# Patient Record
Sex: FEMALE | Race: WHITE | NOT HISPANIC OR LATINO | Employment: FULL TIME | URBAN - METROPOLITAN AREA
[De-identification: names, ages, dates, MRNs, and addresses within clinical notes are randomized per-mention and may not be internally consistent; named-entity substitution may affect disease eponyms.]

---

## 2017-01-26 ENCOUNTER — OFFICE VISIT (OUTPATIENT)
Dept: URGENT CARE | Facility: URGENT CARE | Age: 42
End: 2017-01-26
Payer: COMMERCIAL

## 2017-01-26 VITALS
OXYGEN SATURATION: 99 % | TEMPERATURE: 97 F | SYSTOLIC BLOOD PRESSURE: 106 MMHG | BODY MASS INDEX: 20.57 KG/M2 | HEIGHT: 66 IN | WEIGHT: 128 LBS | DIASTOLIC BLOOD PRESSURE: 66 MMHG | HEART RATE: 68 BPM

## 2017-01-26 DIAGNOSIS — R10.9 FLANK PAIN: ICD-10-CM

## 2017-01-26 DIAGNOSIS — N10 PYELONEPHRITIS, ACUTE: Primary | ICD-10-CM

## 2017-01-26 DIAGNOSIS — R82.90 NONSPECIFIC FINDING ON EXAMINATION OF URINE: ICD-10-CM

## 2017-01-26 LAB
ALBUMIN UR-MCNC: 100 MG/DL
APPEARANCE UR: ABNORMAL
BACTERIA #/AREA URNS HPF: ABNORMAL /HPF
BILIRUB UR QL STRIP: NEGATIVE
COLOR UR AUTO: YELLOW
GLUCOSE UR STRIP-MCNC: NEGATIVE MG/DL
HGB UR QL STRIP: ABNORMAL
KETONES UR STRIP-MCNC: NEGATIVE MG/DL
LEUKOCYTE ESTERASE UR QL STRIP: ABNORMAL
NITRATE UR QL: POSITIVE
PH UR STRIP: 6 PH (ref 5–7)
RBC #/AREA URNS AUTO: ABNORMAL /HPF (ref 0–2)
SP GR UR STRIP: 1.02 (ref 1–1.03)
URN SPEC COLLECT METH UR: ABNORMAL
UROBILINOGEN UR STRIP-ACNC: 0.2 EU/DL (ref 0.2–1)
WBC #/AREA URNS AUTO: ABNORMAL /HPF (ref 0–2)

## 2017-01-26 PROCEDURE — 87186 SC STD MICRODIL/AGAR DIL: CPT | Performed by: FAMILY MEDICINE

## 2017-01-26 PROCEDURE — 81001 URINALYSIS AUTO W/SCOPE: CPT | Performed by: FAMILY MEDICINE

## 2017-01-26 PROCEDURE — 87088 URINE BACTERIA CULTURE: CPT | Performed by: FAMILY MEDICINE

## 2017-01-26 PROCEDURE — 87086 URINE CULTURE/COLONY COUNT: CPT | Performed by: FAMILY MEDICINE

## 2017-01-26 PROCEDURE — 99213 OFFICE O/P EST LOW 20 MIN: CPT | Performed by: FAMILY MEDICINE

## 2017-01-26 RX ORDER — CIPROFLOXACIN 500 MG/1
500 TABLET, FILM COATED ORAL 2 TIMES DAILY
Qty: 14 TABLET | Refills: 0 | Status: SHIPPED | OUTPATIENT
Start: 2017-01-26 | End: 2017-02-02

## 2017-01-26 NOTE — MR AVS SNAPSHOT
"              After Visit Summary   1/26/2017    Nupur De La Rosa    MRN: [id number]           Patient Information     Date Of Birth          1975        Visit Information        Provider Department      1/26/2017 6:15 PM Melita Barreto MD [facility]        Today's Diagnoses     Pyelonephritis, acute    -  1     Flank pain         Nonspecific finding on examination of urine           Care Instructions      Kidney Infection (Adult, Female)    An infection of the kidney is also called \"pyelonephritis.\" It usually starts as a bladder infection (\"cystitis\") that spreads to the kidneys. Pyelonephritis is more serious than a bladder infection. It can cause severe illness if not treated properly.  The usual symptoms include an aching pain in the back, side, or lower abdomen. Other symptoms may include fever, chills, nausea, vomiting, blood in the urine, an urge to urinate, and a burning sensation when urinating. Treatment is usually oral antibiotics, or in more severe cases, intramuscular or IV antibiotics. These are started right away and may be changed once urine culture results determine the infecting organisms.  Home care  The following are general care guidelines:  1. Stay home from work or school. Rest in bed until your fever breaks and you are feeling better.  2. Drink lots of fluid (at least 6 to 8 glasses a day, unless you must restrict fluids for other medical reasons). This will force the medicine into your urinary system and flush the bacteria out of your body.  3. Avoid sex until you have finished all of your medicine and your symptoms are gone.  4. Avoid caffeine, alcohol, and spicy foods. These foods may irritate the kidney and bladder.  5. You may use acetaminophen or ibuprofen to control pain, unless another pain medicine was prescribed. [NOTE: If you have chronic liver or kidney disease or ever had a stomach ulcer or GI bleeding, talk with your doctor before " using these medicines.]  Follow-up care  Follow up with your doctor or as advised by our staff for a repeat urine test in 10 days. This will ensure that your infection is fully cleared.  [NOTE: If you had an X-ray, CT scan, or other diagnostic test, it will be reviewed by a specialist. You will be notified of any new findings that may affect your care.]  When to seek medical care  Get prompt medical attention if any of the following occur:    Fever over 100.4 F (38.0 C) after 48 hours of treatment    No improvement by the third day of treatment    Increasing back or abdominal pain    Repeated vomiting or inability to take oral medicine    Weakness, dizziness, or fainting    6865-5255 [employer]. [address] [place] [address], [place] [address]. All rights reserved. This information is not intended as a substitute for professional medical care. Always follow your healthcare professional's instructions.              Follow-ups after your visit        Who to contact     If you have questions or need follow up information about today's clinic visit or your schedule please contact [facility] directly at [phone].  Normal or non-critical lab and imaging results will be communicated to you by Zalandohart, letter or phone within 4 business days after the clinic has received the results. If you do not hear from us within 7 days, please contact the clinic through Nymirumt or phone. If you have a critical or abnormal lab result, we will notify you by phone as soon as possible.  Submit refill requests through [employer] or call your pharmacy and they will forward the refill request to us. Please allow 3 business days for your refill to be completed.          Additional Information About Your Visit        [employer] Information     [employer] gives you secure access to your electronic health record. If you see a primary care provider, you can also send messages to your care team and make appointments. If  "you have questions, please call your primary care clinic.  If you do not have a primary care provider, please call [phone] and they will assist you.        Care EveryWhere ID     This is your Care EveryWhere ID. This could be used by other organizations to access your [place] medical records  [id number]        Your Vitals Were     Pulse Temperature Height BMI (Body Mass Index) Pulse Oximetry Breastfeeding?    68 97  F (36.1  C) (Tympanic) 5' 6\" (1.676 m) 20.67 kg/m2 99% No       Blood Pressure from Last 3 Encounters:   01/26/17 106/66   12/12/16 115/65   10/08/16 104/60    Weight from Last 3 Encounters:   01/26/17 128 lb (58.06 kg)   12/12/16 126 lb (57.153 kg)   10/08/16 129 lb (58.514 kg)              We Performed the Following     *UA reflex to Microscopic and Culture ([facility] (except [place] and [place])     Urine Culture Aerobic Bacterial     Urine Microscopic          Today's Medication Changes          These changes are accurate as of: 1/26/17  7:45 PM.  If you have any questions, ask your nurse or doctor.               Start taking these medicines.        Dose/Directions    ciprofloxacin 500 MG tablet   Commonly known as:  CIPRO   Used for:  Pyelonephritis, acute   Started by:  Melita Barreto MD        Dose:  500 mg   Take 1 tablet (500 mg) by mouth 2 times daily for 7 days   Quantity:  14 tablet   Refills:  0            Where to get your medicines      These medications were sent to [id number] - [place], [place] - [address] AT [address]  [address], [place] [address]     Phone:  [phone]    - ciprofloxacin 500 MG tablet             Primary Care Provider Office Phone # Fax #    [facility] [phone]       [facility] [address] [place] [address]        Thank you!     Thank you for choosing [facility]  for " your care. Our goal is always to provide you with excellent care. Hearing back from our patients is one way we can continue to improve our services. Please take a few minutes to complete the written survey that you may receive in the mail after your visit with us. Thank you!             Your Updated Medication List - Protect others around you: Learn how to safely use, store and throw away your medicines at www.disposemymeds.org.          This list is accurate as of: 1/26/17  7:45 PM.  Always use your most recent med list.                   Brand Name Dispense Instructions for use    ciprofloxacin 500 MG tablet    CIPRO    14 tablet    Take 1 tablet (500 mg) by mouth 2 times daily for 7 days       estrogens (conjugated) 1.25 MG tablet    PREMARIN    30 tablet    Take 1 tablet (1.25 mg) by mouth daily

## 2017-01-27 NOTE — PATIENT INSTRUCTIONS
"  Kidney Infection (Adult, Female)    An infection of the kidney is also called \"pyelonephritis.\" It usually starts as a bladder infection (\"cystitis\") that spreads to the kidneys. Pyelonephritis is more serious than a bladder infection. It can cause severe illness if not treated properly.  The usual symptoms include an aching pain in the back, side, or lower abdomen. Other symptoms may include fever, chills, nausea, vomiting, blood in the urine, an urge to urinate, and a burning sensation when urinating. Treatment is usually oral antibiotics, or in more severe cases, intramuscular or IV antibiotics. These are started right away and may be changed once urine culture results determine the infecting organisms.  Home care  The following are general care guidelines:  1. Stay home from work or school. Rest in bed until your fever breaks and you are feeling better.  2. Drink lots of fluid (at least 6 to 8 glasses a day, unless you must restrict fluids for other medical reasons). This will force the medicine into your urinary system and flush the bacteria out of your body.  3. Avoid sex until you have finished all of your medicine and your symptoms are gone.  4. Avoid caffeine, alcohol, and spicy foods. These foods may irritate the kidney and bladder.  5. You may use acetaminophen or ibuprofen to control pain, unless another pain medicine was prescribed. [NOTE: If you have chronic liver or kidney disease or ever had a stomach ulcer or GI bleeding, talk with your doctor before using these medicines.]  Follow-up care  Follow up with your doctor or as advised by our staff for a repeat urine test in 10 days. This will ensure that your infection is fully cleared.  [NOTE: If you had an X-ray, CT scan, or other diagnostic test, it will be reviewed by a specialist. You will be notified of any new findings that may affect your care.]  When to seek medical care  Get prompt medical attention if any of the following occur:    Fever " over 100.4 F (38.0 C) after 48 hours of treatment    No improvement by the third day of treatment    Increasing back or abdominal pain    Repeated vomiting or inability to take oral medicine    Weakness, dizziness, or fainting    0233-9504 [employer]. [address] [place] [address], [place] [address]. All rights reserved. This information is not intended as a substitute for professional medical care. Always follow your healthcare professional's instructions.

## 2017-01-27 NOTE — PROGRESS NOTES
SUBJECTIVE: Nupur De La Rosa is a 41 year old female who complains of urinary frequency and bladder pressure x 1 day, with left flank pain, feverish feeling and chills but no abnormal vaginal discharge or bleeding.     OBJECTIVE: Appears well, in no apparent distress.  Vital signs are normal. The abdomen is soft with mild suprapubic tenderness but no guarding, mass, rebound or organomegaly.  Mild left CVA tenderness.  Labs:   Results for orders placed or performed in visit on 01/26/17   *UA reflex to Microscopic and Culture ([facility] (except [place] and [place])   Result Value Ref Range    Color Urine Yellow     Appearance Urine Slightly Cloudy     Glucose Urine Negative NEG mg/dL    Bilirubin Urine Negative NEG    Ketones Urine Negative NEG mg/dL    Specific Gravity Urine 1.020 1.003 - 1.035    Blood Urine Moderate (A) NEG    pH Urine 6.0 5.0 - 7.0 pH    Protein Albumin Urine 100 (A) NEG mg/dL    Urobilinogen Urine 0.2 0.2 - 1.0 EU/dL    Nitrite Urine Positive (A) NEG    Leukocyte Esterase Urine Moderate (A) NEG    Source Midstream Urine    Urine Microscopic   Result Value Ref Range    WBC Urine  (A) 0 - 2 /HPF    RBC Urine 2-5 (A) 0 - 2 /HPF    Bacteria Urine Few (A) NEG /HPF      ASSESSMENT: Acute pyelonephritis    PLAN: Treatment per orders - also push fluids, may use Pyridium OTC prn. Call or return to clinic prn if these symptoms worsen or fail to improve as anticipated.  Melita Benton MD

## 2017-01-28 LAB
BACTERIA SPEC CULT: ABNORMAL
MICRO REPORT STATUS: ABNORMAL
MICROORGANISM SPEC CULT: ABNORMAL
SPECIMEN SOURCE: ABNORMAL

## 2017-01-31 DIAGNOSIS — B37.9 YEAST INFECTION: Primary | ICD-10-CM

## 2017-01-31 RX ORDER — FLUCONAZOLE 150 MG/1
150 TABLET ORAL ONCE
Qty: 1 TABLET | Refills: 0 | Status: SHIPPED | OUTPATIENT
Start: 2017-01-31 | End: 2017-01-31

## 2017-01-31 NOTE — TELEPHONE ENCOUNTER
Was seen in UC 1/26/2017 and put on cipro and now having itchy, sore vaginal area with white discharge. Please sign off on diflucan

## 2017-01-31 NOTE — TELEPHONE ENCOUNTER
Patient  Thinks she may have a yeast  Infection from the currently taking. Please call  Pt to advise.        Thank you,   Lan DONALD   [facility]  [phone]

## 2017-02-15 ENCOUNTER — OFFICE VISIT (OUTPATIENT)
Dept: FAMILY MEDICINE | Facility: CLINIC | Age: 42
End: 2017-02-15
Payer: COMMERCIAL

## 2017-02-15 VITALS
TEMPERATURE: 97.8 F | HEIGHT: 66 IN | DIASTOLIC BLOOD PRESSURE: 72 MMHG | WEIGHT: 122 LBS | SYSTOLIC BLOOD PRESSURE: 103 MMHG | BODY MASS INDEX: 19.61 KG/M2 | HEART RATE: 66 BPM

## 2017-02-15 DIAGNOSIS — R13.10 DYSPHAGIA, UNSPECIFIED TYPE: ICD-10-CM

## 2017-02-15 DIAGNOSIS — Z87.440 HISTORY OF KIDNEY INFECTION: ICD-10-CM

## 2017-02-15 DIAGNOSIS — N89.8 VAGINAL ITCHING: Primary | ICD-10-CM

## 2017-02-15 LAB
ALBUMIN UR-MCNC: NEGATIVE MG/DL
APPEARANCE UR: CLEAR
BILIRUB UR QL STRIP: NEGATIVE
COLOR UR AUTO: YELLOW
GLUCOSE UR STRIP-MCNC: NEGATIVE MG/DL
HGB UR QL STRIP: NEGATIVE
KETONES UR STRIP-MCNC: NEGATIVE MG/DL
LEUKOCYTE ESTERASE UR QL STRIP: NEGATIVE
MICRO REPORT STATUS: NORMAL
NITRATE UR QL: NEGATIVE
PH UR STRIP: 7.5 PH (ref 5–7)
SP GR UR STRIP: 1.02 (ref 1–1.03)
SPECIMEN SOURCE: NORMAL
URN SPEC COLLECT METH UR: ABNORMAL
UROBILINOGEN UR STRIP-ACNC: 0.2 EU/DL (ref 0.2–1)
WET PREP SPEC: NORMAL

## 2017-02-15 PROCEDURE — 81003 URINALYSIS AUTO W/O SCOPE: CPT | Performed by: NURSE PRACTITIONER

## 2017-02-15 PROCEDURE — 99214 OFFICE O/P EST MOD 30 MIN: CPT | Performed by: NURSE PRACTITIONER

## 2017-02-15 PROCEDURE — 87210 SMEAR WET MOUNT SALINE/INK: CPT | Performed by: NURSE PRACTITIONER

## 2017-02-15 NOTE — MR AVS SNAPSHOT
After Visit Summary   2/15/2017    Nupur De La Rosa    MRN: [id number]           Patient Information     Date Of Birth          1975        Visit Information        Provider Department      2/15/2017 4:20 PM Twila Tyson APRN CNP [facility]        Today's Diagnoses     Vaginal itching    -  1    Dysphagia, unspecified type        History of kidney infection           Follow-ups after your visit        Additional Services     OTOLARYNGOLOGY REFERRAL       Your provider has referred you to: [facility]: Adult Ear, Nose and Throat Clinic (Otolaryngology) [facility] [phone]  [email or web address]    Please be aware that coverage of these services is subject to the terms and limitations of your health insurance plan.  Call member services at your health plan with any benefit or coverage questions.      Please bring the following with you to your appointment:    (1) Any X-Rays, CTs or MRIs which have been performed.  Contact the facility where they were done to arrange for  prior to your scheduled appointment.   (2) List of current medications  (3) This referral request   (4) Any documents/labs given to you for this referral                  Who to contact     If you have questions or need follow up information about today's clinic visit or your schedule please contact [facility] directly at [phone].  Normal or non-critical lab and imaging results will be communicated to you by MyChart, letter or phone within 4 business days after the clinic has received the results. If you do not hear from us within 7 days, please contact the clinic through MyChart or phone. If you have a critical or abnormal lab result, we will notify you by phone as soon as possible.  Submit refill requests through [employer] or call your pharmacy and they will forward the refill request to us. Please allow 3 business days for  "your refill to be completed.          Additional Information About Your Visit        [employer]endyhart Information     [employer] gives you secure access to your electronic health record. If you see a primary care provider, you can also send messages to your care team and make appointments. If you have questions, please call your primary care clinic.  If you do not have a primary care provider, please call [phone] and they will assist you.        Care EveryWhere ID     This is your Care EveryWhere ID. This could be used by other organizations to access your [place] medical records  [id number]        Your Vitals Were     Pulse Temperature Height BMI (Body Mass Index)          66 97.8  F (36.6  C) (Tympanic) 5' 6\" (1.676 m) 19.69 kg/m2         Blood Pressure from Last 3 Encounters:   02/15/17 103/72   01/26/17 106/66   12/12/16 115/65    Weight from Last 3 Encounters:   02/15/17 122 lb (55.3 kg)   01/26/17 128 lb (58.1 kg)   12/12/16 126 lb (57.2 kg)              We Performed the Following     OTOLARYNGOLOGY REFERRAL     UA reflex to Microscopic and Culture     Wet prep        Primary Care Provider Office Phone # Fax #    [facility] [phone]       [facility] [address] [place] [address]        Thank you!     Thank you for choosing [facility]  for your care. Our goal is always to provide you with excellent care. Hearing back from our patients is one way we can continue to improve our services. Please take a few minutes to complete the written survey that you may receive in the mail after your visit with us. Thank you!             Your Updated Medication List - Protect others around you: Learn how to safely use, store and throw away your medicines at www.disposemymeds.org.          This list is accurate as of: 2/15/17  4:55 PM.  Always use your most recent med list.                   Brand Name Dispense Instructions for use    estrogens " (conjugated) 1.25 MG tablet    PREMARIN    30 tablet    Take 1 tablet (1.25 mg) by mouth daily

## 2017-02-15 NOTE — PROGRESS NOTES
"  SUBJECTIVE:                                                    Nupur De La Rosa is a 41 year old female who presents to clinic today for the following health issues:  Chief Complaint   Patient presents with     Vaginal Problem     Throat Problem     trouble swallowing       Vaginal Symptoms    Duration: 1 month    Description  NO SX right now. Wanting a vaginal swab recheck done today.     Intensity:  mild    Accompanying signs and symptoms (fever/dysuria/abdominal or back pain): None    History  Sexually active: yes, single partner, contraception - none  Possibility of pregnancy: No  Recent antibiotic use: YES- Diflucan     Precipitating or alleviating factors: None    Therapies tried and outcome: Diflucan        Recent URI.  Nasal congestion, postnasal drip.  She is getting over it, but she has had a swallowing problem.  She has problems with choking/pills getting stuck when she swallows.  She does NOT have problems with food or fluids, only with pills and capsules.  \"It feels tight in there.\"   No SOB.  No sore throat.    Left kidney pain.  Nupur had a pyelonephritis in October.  Overall she is better with drinking water.  No fever or chills, but sometimes she has pain in her left upper abd/left back.   She is wondering today if her infection is totally clear.    Constipation/bowels.  She was taking a probiotic, but the medication was getting stuck.  No diarrhea.      Problem list and histories reviewed & adjusted, as indicated.  Additional history: as documented    Patient Active Problem List   Diagnosis     CARDIOVASCULAR SCREENING; LDL GOAL LESS THAN 160     Tight introitus     Swelling of joint, hand, right     Cystitis     S/P complete hysterectomy     Past Surgical History   Procedure Laterality Date     Hysterectomy  1999       Social History   Substance Use Topics     Smoking status: Never Smoker     Smokeless tobacco: Never Used     Alcohol use No     Family History   Problem Relation Age of Onset " "    Thyroid Disease Mother      Thyroid Disease Maternal Grandmother      CANCER Other      self         Current Outpatient Prescriptions   Medication Sig Dispense Refill     estrogens, conjugated, (PREMARIN) 1.25 MG tablet Take 1 tablet (1.25 mg) by mouth daily 30 tablet 2     Allergies   Allergen Reactions     Compazine Visual Disturbance     Recent Labs   Lab Test  03/01/13   1555  06/03/11   1027   HDL   --   70   CR  0.58   --    GFRESTIMATED  >90   --    GFRESTBLACK  >90   --    POTASSIUM  4.2   --    TSH  2.26  1.48      BP Readings from Last 3 Encounters:   02/15/17 103/72   01/26/17 106/66   12/12/16 115/65    Wt Readings from Last 3 Encounters:   02/15/17 122 lb (55.3 kg)   01/26/17 128 lb (58.1 kg)   12/12/16 126 lb (57.2 kg)                  Labs reviewed in EPIC  Problem list, Medication list, Allergies, and Medical/Social/Surgical histories reviewed in [facility] and updated as appropriate.    ROS:  Constitutional, HEENT, cardiovascular, pulmonary, gi and gu systems are negative, except as otherwise noted.    OBJECTIVE:                                                    /72 (BP Location: Left arm, Patient Position: Chair, Cuff Size: Adult Regular)  Pulse 66  Temp 97.8  F (36.6  C) (Tympanic)  Ht 5' 6\" (1.676 m)  Wt 122 lb (55.3 kg)  BMI 19.69 kg/m2  Body mass index is 19.69 kg/(m^2).  Constitutional: healthy, alert and no distress  ENT: bilateral TM's are normal, no posterior oropharynx erythema.  Neck: supple, no adenopathy, thyroid normal to palpation  Cardiovascular: RRR. No murmurs, clicks gallops or rub  Respiratory: Respirations easy and regular. No respiratory distress noted. Lung sounds clear to auscultation.  Gastrointestinal: abdomen flat, soft, nontender to light or deep palpation. Bowel sounds active in 4 quadrants. No hepatosplenomegaly. No rebound or guarding. No CVA tenderness.  Neurologic: Gait normal. Reflexes normal and symmetric. Sensation grossly WNL.  Psychiatric: mentation appears " normal and affect normal/bright         ASSESSMENT/PLAN:                                                    (L29.8) Vaginal itching  (primary encounter diagnosis)  Comment:   Plan: Wet prep        Wet prep is clear today.    (R13.10) Dysphagia, unspecified type  Comment:   Plan: OTOLARYNGOLOGY REFERRAL        I discussed swallowing precautions, wet throat before swallowing.  Avoid capsules and sticky products/foods/for now.  Chew foods thoroughly.     (Z87.440) History of kidney infection  Comment:   Plan: UA reflex to Microscopic and Culture        Push fluids.  UA clear today.    Total: 30 min spent with the patient, >50% time spent face to face counseling regarding 3 separate issues.        [facility]

## 2017-02-15 NOTE — NURSING NOTE
"Chief Complaint   Patient presents with     Vaginal Problem       Initial /72 (BP Location: Left arm, Patient Position: Chair, Cuff Size: Adult Regular)  Pulse 66  Temp 97.8  F (36.6  C) (Tympanic)  Ht 5' 6\" (1.676 m)  Wt 122 lb (55.3 kg)  BMI 19.69 kg/m2 Estimated body mass index is 19.69 kg/(m^2) as calculated from the following:    Height as of this encounter: 5' 6\" (1.676 m).    Weight as of this encounter: 122 lb (55.3 kg).  Medication Reconciliation: complete       Sherrell Ramirez MA     "

## 2017-07-01 ENCOUNTER — HEALTH MAINTENANCE LETTER (OUTPATIENT)
Age: 42
End: 2017-07-01

## 2017-12-14 ENCOUNTER — TELEPHONE (OUTPATIENT)
Dept: FAMILY MEDICINE | Facility: CLINIC | Age: 42
End: 2017-12-14

## 2017-12-14 NOTE — TELEPHONE ENCOUNTER
"12/14/2017      Patient Communication Preferences indicate  Do not contact  and/or communication by \"Phone\" is not preferred. Call not required per Outreach team.          Outreach ,  Waqas Hwang     "

## 2019-03-11 ENCOUNTER — OFFICE VISIT (OUTPATIENT)
Dept: URGENT CARE | Facility: URGENT CARE | Age: 44
End: 2019-03-11
Payer: COMMERCIAL

## 2019-03-11 VITALS
WEIGHT: 129 LBS | OXYGEN SATURATION: 97 % | BODY MASS INDEX: 20.82 KG/M2 | HEART RATE: 87 BPM | SYSTOLIC BLOOD PRESSURE: 119 MMHG | DIASTOLIC BLOOD PRESSURE: 74 MMHG | TEMPERATURE: 98.4 F

## 2019-03-11 DIAGNOSIS — R21 RASH AND NONSPECIFIC SKIN ERUPTION: Primary | ICD-10-CM

## 2019-03-11 PROCEDURE — 99213 OFFICE O/P EST LOW 20 MIN: CPT | Performed by: PHYSICIAN ASSISTANT

## 2019-03-11 RX ORDER — METHYLPREDNISOLONE 4 MG
TABLET, DOSE PACK ORAL
Qty: 21 TABLET | Refills: 0 | Status: SHIPPED | OUTPATIENT
Start: 2019-03-11

## 2019-03-11 NOTE — PROGRESS NOTES
HPI:  Nupur is a 44 yo female who presents for rash on body x today.  Patient recently tx with antibiotic for sinus infection and completed the course 1-2 days ago.  She does not recall the name of the antibiotic but took it for 10 days. Rash started on her cheeks and spread to her neck.  It is not on her torso, front and back, and on her arms.  Her legs are spared so far.  Reports rash itchy on her neck mostly but otherwise itching in not terribly bad.   She can think of no other new foods, lotions, detergents or environmental exposures.  She denies feeling ill otherwise.  Denies sore throat, fever, cold sx, or cough.  No N&V.   Denies SOB, wheezing, or difficulty breathing    ROS:  See HPI      PE:  Vitals & nursing notes reviewed. B/P: 119/74, T: 98.4, P: 87, R: Data Unavailable  Constitutional:  Alert, well nourished, well-developed, NAD  Head:  Atraumatic, normocephalic  Eyes:  Perrla, EOMI, conjunctiva:  Pink   Sclera:  Anicteric  Ears:  Canals clear BL, TM pearly BL  Throat:  No erythema, exudates, or edema to postoropharynx  Neck:  Supple, no cervical LAD  Lungs:  CTA, no wheezes, rhonchi, or rales  CV:  RRR,  no murmur appreciated  Skin:   Macular rash on torso, neck, face, and arms.  Confluent on neck.  Legs so far spared.  No herald patch seen.   No discharge.      ASSESSMENT:  (R21) Rash and nonspecific skin eruption  (primary encounter diagnosis)  Comment: Suspect allergic rxn to antibiotic.   Plan: methylPREDNISolone (MEDROL DOSEPAK) 4 MG tablet        therapy pack      [facility] to find out what was prescribed for her sinus infection and avoid medication in future / have it added to her allergy list.  Continue to think of any new possible exposures.  OTC antihistamine.  Monitor for any new or developing sx.  May try OTC Zantac as adjunct with prednisone if needed.  Patient given printout on allergic rxn including home cares and when to seek emergent tx.    F/U with  PCP if sx persist or worsen.

## 2019-10-03 ENCOUNTER — HEALTH MAINTENANCE LETTER (OUTPATIENT)
Age: 44
End: 2019-10-03

## 2020-11-07 ENCOUNTER — HEALTH MAINTENANCE LETTER (OUTPATIENT)
Age: 45
End: 2020-11-07

## 2021-01-30 ENCOUNTER — HEALTH MAINTENANCE LETTER (OUTPATIENT)
Age: 46
End: 2021-01-30

## 2021-09-05 ENCOUNTER — HEALTH MAINTENANCE LETTER (OUTPATIENT)
Age: 46
End: 2021-09-05

## 2021-12-26 ENCOUNTER — HEALTH MAINTENANCE LETTER (OUTPATIENT)
Age: 46
End: 2021-12-26

## 2022-02-20 ENCOUNTER — HEALTH MAINTENANCE LETTER (OUTPATIENT)
Age: 47
End: 2022-02-20

## 2022-10-23 ENCOUNTER — HEALTH MAINTENANCE LETTER (OUTPATIENT)
Age: 47
End: 2022-10-23

## 2022-11-01 ENCOUNTER — HOSPITAL ENCOUNTER (EMERGENCY)
Facility: HOSPITAL | Age: 47
Discharge: HOME | End: 2022-11-01
Attending: EMERGENCY MEDICINE | Admitting: EMERGENCY MEDICINE
Payer: COMMERCIAL

## 2022-11-01 VITALS
OXYGEN SATURATION: 98 % | HEIGHT: 67 IN | TEMPERATURE: 98.4 F | HEART RATE: 64 BPM | WEIGHT: 140 LBS | SYSTOLIC BLOOD PRESSURE: 114 MMHG | DIASTOLIC BLOOD PRESSURE: 66 MMHG | BODY MASS INDEX: 21.97 KG/M2 | RESPIRATION RATE: 20 BRPM

## 2022-11-01 DIAGNOSIS — N30.00 ACUTE CYSTITIS WITHOUT HEMATURIA: ICD-10-CM

## 2022-11-01 LAB
ALBUMIN UR-MCNC: NEGATIVE MG/DL
APPEARANCE UR: CLEAR
BACTERIA #/AREA URNS HPF: ABNORMAL /HPF
BILIRUB UR QL STRIP: NEGATIVE
COLOR UR AUTO: COLORLESS
GLUCOSE UR STRIP-MCNC: NEGATIVE MG/DL
HGB UR QL STRIP: NEGATIVE
KETONES UR STRIP-MCNC: NEGATIVE MG/DL
LEUKOCYTE ESTERASE UR QL STRIP: ABNORMAL
NITRATE UR QL: POSITIVE
PH UR STRIP: 6 [PH] (ref 5–7)
RBC URINE: <1 /HPF
SP GR UR STRIP: 1.01 (ref 1–1.03)
SQUAMOUS EPITHELIAL: <1 /HPF
UROBILINOGEN UR STRIP-MCNC: <2 MG/DL
WBC URINE: 3 /HPF

## 2022-11-01 PROCEDURE — 250N000013 HC RX MED GY IP 250 OP 250 PS 637: Performed by: EMERGENCY MEDICINE

## 2022-11-01 PROCEDURE — 81001 URINALYSIS AUTO W/SCOPE: CPT | Performed by: EMERGENCY MEDICINE

## 2022-11-01 PROCEDURE — 99283 EMERGENCY DEPT VISIT LOW MDM: CPT

## 2022-11-01 PROCEDURE — 87186 SC STD MICRODIL/AGAR DIL: CPT | Performed by: EMERGENCY MEDICINE

## 2022-11-01 RX ORDER — CYCLOBENZAPRINE HCL 10 MG
10 TABLET ORAL ONCE
Status: COMPLETED | OUTPATIENT
Start: 2022-11-01 | End: 2022-11-01

## 2022-11-01 RX ORDER — CEPHALEXIN 500 MG/1
500 CAPSULE ORAL 2 TIMES DAILY
Qty: 14 CAPSULE | Refills: 0 | Status: SHIPPED | OUTPATIENT
Start: 2022-11-01 | End: 2022-11-08

## 2022-11-01 RX ADMIN — CYCLOBENZAPRINE 10 MG: 10 TABLET, FILM COATED ORAL at 15:15

## 2022-11-01 NOTE — ED NOTES
"ED Triage Provider Note  [facility] EMERGENCY DEPARTMENT  Encounter Date: Nov 1, 2022    History:  Chief Complaint   Patient presents with     Back Pain     Nupursa Amirah De La Rosa is a 47 year old female who presents to the ED with R sided low back pain. Atraumatic. Previous hx hysterectomy/radiation for cervical cancer. Colonoscopy 2 weeks ago, pain started 1 wk ago.    Review of Systems:  No intontinence, no saddle anesthesia, no urinary sx, no fever    Exam:  Temp 98.4  F (36.9  C)   Ht 1.702 m (5' 7\")   Wt 63.5 kg (140 lb)   BMI 21.93 kg/m    General: No acute distress. Appears stated age.   Cardio: normal rate, extremities well perfused  Resp: Normal work of breathing, grossly normal respiratory rate  Neuro: Alert. Facial movement grossly symmetric. Grossly intact strength.   Ambulatory without limp. R lower lumbosacral paraspinal pain    Medical Decision Making:  Patient arriving to the ED with problem as above. A medical screening exam was performed. Initial differential diagnosis includes but not limited to lumbosacral strain, pyelo, not cauda equina.    meds orders initiated from Triage. The patient is most appropriate to return to the waiting room.       Radha Adkins MD  11/1/2022 at 3:02 PM     Radha Adkins MD  11/01/22 1504    "

## 2022-11-01 NOTE — ED TRIAGE NOTES
Patient states lumbar pain for one week, no trauma, no difficulty with bladder or stools. Past hx of radiation after hyst for cancer

## 2022-11-02 LAB — BACTERIA UR CULT: ABNORMAL

## 2022-11-02 NOTE — ED PROVIDER NOTES
EMERGENCY DEPARTMENT ENCOUNTER      NAME: Nupur De La Rosa  AGE: 47 year old female  YOB: 1975  MRN: [id number]  EVALUATION DATE & TIME: No admission date for patient encounter.    PCP: Twila Tyson    ED PROVIDER: Pablo Freeman M.D.      Chief Complaint   Patient presents with     Back Pain         FINAL IMPRESSION:  1. Acute cystitis without hematuria          ED COURSE & MEDICAL DECISION MAKING:    Pertinent Labs & Imaging studies reviewed. (See chart for details)  47 year old female presents to the Emergency Department for evaluation of low back pain. Patient appears non toxic with stable vitals signs, patient is afebrile with no tachycardia or hypoxia, no increased work of breathing. Overall exam is benign.  Lungs are clear and abdomen is benign.  She denies any falls or trauma, change in bowel or bladder habits, saddle anesthesia, bowel or bladder incontinence, recent spinal instrumentation, fevers or other red flags.  She denies any falls or trauma she has no midline back tenderness.  Clinically, nothing to suggest vertebral body fracture or dislocation, spinal cord compromise, cauda equina syndrome, epidural bleed or abscess, discitis or osteomyelitis.  No fevers or other systemic signs of illness, urinalysis obtained from triage suggestive of infection with positive leukocytes and nitrates, the patient denies any dysuria states that she has baseline increased urinary frequency but also states that she has had no dysuria with her past episodes of cystitis.  Clinically, suspect uncomplicated cystitis, consider pyelonephritis though she has no flank pain or CVA tenderness, back pain is lower down, no vomiting, no fevers.  That said I did offer screening labs and CT imaging here tonight, discussed risk and benefits and I feel the patient has capacity and understands the risk, at this time the patient defers any further investigation which I feel is very reasonable given benign  exam and well appearance.  Will discharge on a course of oral antibiotics for uncomplicated cystitis and recommend she follow-up with primary care in the next 2 to 3 days for continued outpatient management evaluation.  Discussed all these findings recommendations with the patient felt reassured and comfortable discharge.  Return precautions provided.        Medical Decision Making    Supplemental history from: N/A    External Record(s) Reviewed: Inpatient Record    Differential Diagnosis: See MDM charting for differential considered.     I performed an independent interpretation of the: N/A    Discussed with radiology regarding test interpretation: N/A    Discussion of management with another provider: See ED Course    The following testing was considered but ultimately not selected: None     I considered prescription management with: Antibiotic    The patient's care impacted: None    Consideration of Admission/Observation: N/A - Patient discharged without consideration for admission    Care significantly affected by Social Determinants of Health including: N/A    7:07 PM I met with the patient and performed my initial interview and exam   7:13 PM I discussed plan for discharge with the patient     At the conclusion of the encounter I discussed the results of all of the tests and the disposition. The questions were answered and return precautions provided. The patient or family acknowledged understanding and was agreeable with the care plan.         MEDICATIONS GIVEN IN THE EMERGENCY:  Medications   cyclobenzaprine (FLEXERIL) tablet 10 mg (10 mg Oral Given 11/1/22 1515)       NEW PRESCRIPTIONS STARTED AT TODAY'S ER VISIT  Discharge Medication List as of 11/1/2022  7:20 PM      START taking these medications    Details   cephALEXin (KEFLEX) 500 MG capsule Take 1 capsule (500 mg) by mouth 2 times daily for 7 days, Disp-14 capsule, R-0, Local Print               "    =================================================================    HPI    Patient information was obtained from: Patient     Use of Intrepreter: N/A         Nupur De La Rosa is a 47 year old female who presents with back pain    Patient reports that they have been experiencing \"really tight\" low back pain \"for almost a week\". They report that they were walking and \"all of a sudden it seized up\" on them. The patient reports that nothing helps the pain, but \"bending hurts\" and sitting for a long time makes it worse. The patient notes that they had a colonoscopy 2 weeks ago and a biopsy was taken and they worry that something could be inflamed. The patient denies dysuria or hematuria. They note that they \"pee a lot anyway\" so they are unsure if they are experiencing an increase in frequency. Patient notes diarrhea and constipation, but reports that is baseline. Patient denies cough, fever, chest pain, or vomiting.     REVIEW OF SYSTEMS   Constitutional:  Denies fever, chills  Respiratory:  Denies productive cough or increased work of breathing  Cardiovascular:  Denies chest pain, palpitations  GI:  Denies abdominal pain, nausea, vomiting, or change in bowel or bladder habits  :Denies dysuria or hematuria  Musculoskeletal:  Denies any new muscle/joint swelling. Positive for low back pain  Skin:  Denies rash   Neurologic:  Denies focal weakness  All systems negative except as marked.     PAST MEDICAL HISTORY:  Past Medical History:   Diagnosis Date     Cervical cancer (H) 1999    radiation therapy and radiation implants       PAST SURGICAL HISTORY:  Past Surgical History:   Procedure Laterality Date     HYSTERECTOMY  1999         CURRENT MEDICATIONS:    Prior to Admission medications    Medication Sig Start Date End Date Taking? Authorizing Provider   estrogens, conjugated, (PREMARIN) 1.25 MG tablet Take 1 tablet (1.25 mg) by mouth daily 10/7/13   Twila Tyson APRN CNP   methylPREDNISolone (MEDROL " "DOSEPAK) 4 MG tablet therapy pack Follow Package Directions 3/11/19   Cristina Szymanski PA-C        ALLERGIES:  Allergies   Allergen Reactions     Compazine Visual Disturbance       FAMILY HISTORY:  Family History   Problem Relation Age of Onset     Thyroid Disease Mother      Thyroid Disease Maternal Grandmother      Cancer Other         self       SOCIAL HISTORY:   Social History     Socioeconomic History     Marital status:    Occupational History     Occupation:      Employer: [employer]     Comment:  center   Tobacco Use     Smoking status: Never     Smokeless tobacco: Never   Substance and Sexual Activity     Alcohol use: No     Drug use: No     Sexual activity: Yes     Partners: Male   Other Topics Concern     Parent/sibling w/ CABG, MI or angioplasty before 65F 55M? No   Social History Narrative    Caffeine intake/servings daily - 0-1    Calcium intake/servings daily - 2-3    Exercise 5 times weekly - describe walk    Sunscreen used - Yes    Seatbelts used - Yes    Guns stored in the home - No    Self Breast Exam - Yes    Pap test up to date -  Yes    Eye exam up to date -  Yes    Dental exam up to date -  Yes    DEXA scan up to date -  Not Applicable    Flex Sig/Colonoscopy up to date -  11 yrs ago    Mammography up to date -  11 yrs ago    Immunizations reviewed and up to date -NO    Abuse: Current or Past (Physical, Sexual or Emotional) - No    Do you feel safe in your environment - Yes    Do you cope well with stress - Yes    Do you suffer from insomnia - No    Last updated by: Aliyah Cuadra MA 6/3/2011                   VITALS:  Patient Vitals for the past 24 hrs:   BP Temp Pulse Resp SpO2 Height Weight   11/01/22 1921 114/66 -- 64 -- -- -- --   11/01/22 1504 131/59 -- 80 20 98 % -- --   11/01/22 1503 -- 98.4  F (36.9  C) -- -- -- 1.702 m (5' 7\") 63.5 kg (140 lb)        PHYSICAL EXAM    Constitutional:  Awake, alert, in no apparent distress  HENT:  Normocephalic, Atraumatic. " Bilateral external ears normal. Oropharynx moist. Nose normal. Neck- Normal range of motion with no guarding, No midline cervical tenderness, Supple, No stridor.   Eyes:  PERRL, EOMI with no signs of entrapment, Conjunctiva normal, No discharge.   Respiratory:  Normal breath sounds, No respiratory distress, No wheezing.    Cardiovascular:  Normal heart rate, Normal rhythm, No appreciable rubs or gallops.   GI:  Soft, No tenderness, No distension, No palpable masses  : no CVA tenderness   Musculoskeletal:  Intact distal pulses, No edema. Good range of motion in all major joints. No tenderness to palpation or major deformities noted.  Back-nontender along midline cervical, thoracic and lumbar spine with no step-offs or signs of trauma.  Integument:  Warm, Dry, No erythema, No rash or vesicular lesions.  Neurologic:  Alert & oriented, Normal motor function, Normal sensory function, No focal deficits noted.   Psychiatric:  Affect normal, Judgment normal, Mood normal.     LAB:  All pertinent labs reviewed and interpreted.  Results for orders placed or performed during the hospital encounter of 11/01/22   UA with Microscopic reflex to Culture    Specimen: Urine, Midstream   Result Value Ref Range    Color Urine Colorless Colorless, Straw, Light Yellow, Yellow    Appearance Urine Clear Clear    Glucose Urine Negative Negative mg/dL    Bilirubin Urine Negative Negative    Ketones Urine Negative Negative mg/dL    Specific Gravity Urine 1.006 1.001 - 1.030    Blood Urine Negative Negative    pH Urine 6.0 5.0 - 7.0    Protein Albumin Urine Negative Negative mg/dL    Urobilinogen Urine <2.0 <2.0 mg/dL    Nitrite Urine Positive (A) Negative    Leukocyte Esterase Urine 25 William/uL (A) Negative    Bacteria Urine Few (A) None Seen /HPF    RBC Urine <1 <=2 /HPF    WBC Urine 3 <=5 /HPF    Squamous Epithelials Urine <1 <=1 /HPF       RADIOLOGY:  No orders to display          EKG:      I have independently reviewed and interpreted the  EKG(s) documented above.    PROCEDURES:       I, Sydnee Armani, am serving as a scribe to document services personally performed by Pablo Freeman MD, based on my observation and the provider's statements to me. I, Pablo Freeman MD attest that Sydnee Lomeli is acting in a scribe capacity, has observed my performance of the services and has documented them in accordance with my direction.    Pablo Freeman M.D.  Emergency Medicine  [facility] EMERGENCY DEPARTMENT  [address] [place] [id number]  [phone]  Dept: [phone]     Pablo Freeman MD  11/02/22 0038

## 2023-01-12 PROBLEM — Z00.00 HEALTHCARE MAINTENANCE: Status: ACTIVE | Noted: 2023-01-12

## 2023-04-02 ENCOUNTER — HEALTH MAINTENANCE LETTER (OUTPATIENT)
Age: 48
End: 2023-04-02

## 2024-01-29 ENCOUNTER — LAB REQUISITION (OUTPATIENT)
Dept: LAB | Facility: CLINIC | Age: 49
End: 2024-01-29

## 2024-01-29 DIAGNOSIS — Z13.220 ENCOUNTER FOR SCREENING FOR LIPOID DISORDERS: ICD-10-CM

## 2024-01-29 DIAGNOSIS — Z11.59 ENCOUNTER FOR SCREENING FOR OTHER VIRAL DISEASES: ICD-10-CM

## 2024-01-29 PROCEDURE — 80061 LIPID PANEL: CPT | Performed by: FAMILY MEDICINE

## 2024-01-29 PROCEDURE — 86803 HEPATITIS C AB TEST: CPT | Performed by: FAMILY MEDICINE

## 2024-01-30 LAB
CHOLEST SERPL-MCNC: 250 MG/DL
FASTING STATUS PATIENT QL REPORTED: ABNORMAL
HCV AB SERPL QL IA: NONREACTIVE
HDLC SERPL-MCNC: 64 MG/DL
LDLC SERPL CALC-MCNC: 156 MG/DL
NONHDLC SERPL-MCNC: 186 MG/DL
TRIGL SERPL-MCNC: 151 MG/DL

## 2024-02-19 ENCOUNTER — ANCILLARY PROCEDURE (OUTPATIENT)
Dept: MAMMOGRAPHY | Facility: HOSPITAL | Age: 49
End: 2024-02-19
Payer: COMMERCIAL

## 2024-02-19 DIAGNOSIS — Z12.31 VISIT FOR SCREENING MAMMOGRAM: ICD-10-CM

## 2024-02-19 PROCEDURE — 77063 BREAST TOMOSYNTHESIS BI: CPT

## 2024-03-10 ENCOUNTER — APPOINTMENT (OUTPATIENT)
Dept: CT IMAGING | Facility: HOSPITAL | Age: 49
End: 2024-03-10
Attending: EMERGENCY MEDICINE
Payer: COMMERCIAL

## 2024-03-10 ENCOUNTER — HOSPITAL ENCOUNTER (EMERGENCY)
Facility: HOSPITAL | Age: 49
Discharge: HOME | End: 2024-03-10
Attending: EMERGENCY MEDICINE | Admitting: EMERGENCY MEDICINE
Payer: COMMERCIAL

## 2024-03-10 ENCOUNTER — APPOINTMENT (OUTPATIENT)
Dept: ULTRASOUND IMAGING | Facility: HOSPITAL | Age: 49
End: 2024-03-10
Attending: EMERGENCY MEDICINE
Payer: COMMERCIAL

## 2024-03-10 VITALS
TEMPERATURE: 97.8 F | WEIGHT: 141.5 LBS | SYSTOLIC BLOOD PRESSURE: 115 MMHG | BODY MASS INDEX: 22.74 KG/M2 | OXYGEN SATURATION: 99 % | HEART RATE: 77 BPM | HEIGHT: 66 IN | DIASTOLIC BLOOD PRESSURE: 57 MMHG | RESPIRATION RATE: 20 BRPM

## 2024-03-10 DIAGNOSIS — N39.0 URINARY TRACT INFECTION WITHOUT HEMATURIA, SITE UNSPECIFIED: ICD-10-CM

## 2024-03-10 LAB
ALBUMIN SERPL BCG-MCNC: 4 G/DL (ref 3.5–5.2)
ALBUMIN UR-MCNC: ABNORMAL G/DL
ALP SERPL-CCNC: 54 U/L (ref 40–150)
ALT SERPL W P-5'-P-CCNC: 14 U/L (ref 0–50)
ANION GAP SERPL CALCULATED.3IONS-SCNC: 7 MMOL/L (ref 7–15)
APPEARANCE UR: CLEAR
AST SERPL W P-5'-P-CCNC: 15 U/L (ref 0–45)
BACTERIA #/AREA URNS HPF: ABNORMAL /HPF
BASOPHILS # BLD AUTO: 0 10E3/UL (ref 0–0.2)
BASOPHILS NFR BLD AUTO: 0 %
BILIRUB DIRECT SERPL-MCNC: <0.2 MG/DL (ref 0–0.3)
BILIRUB SERPL-MCNC: 0.6 MG/DL
BILIRUB UR QL STRIP: ABNORMAL
BUN SERPL-MCNC: 9.5 MG/DL (ref 6–20)
CALCIUM SERPL-MCNC: 9.1 MG/DL (ref 8.6–10)
CHLORIDE SERPL-SCNC: 103 MMOL/L (ref 98–107)
COLOR UR AUTO: ABNORMAL
CREAT SERPL-MCNC: 0.7 MG/DL (ref 0.51–0.95)
DEPRECATED HCO3 PLAS-SCNC: 28 MMOL/L (ref 22–29)
EGFRCR SERPLBLD CKD-EPI 2021: >90 ML/MIN/1.73M2
EOSINOPHIL # BLD AUTO: 0.1 10E3/UL (ref 0–0.7)
EOSINOPHIL NFR BLD AUTO: 2 %
ERYTHROCYTE [DISTWIDTH] IN BLOOD BY AUTOMATED COUNT: 12 % (ref 10–15)
GLUCOSE SERPL-MCNC: 101 MG/DL (ref 70–99)
GLUCOSE UR STRIP-MCNC: ABNORMAL MG/DL
HCT VFR BLD AUTO: 39.8 % (ref 35–47)
HGB BLD-MCNC: 13.5 G/DL (ref 11.7–15.7)
HGB UR QL STRIP: ABNORMAL
HOLD SPECIMEN: NORMAL
HOLD SPECIMEN: NORMAL
IMM GRANULOCYTES # BLD: 0 10E3/UL
IMM GRANULOCYTES NFR BLD: 0 %
KETONES UR STRIP-MCNC: ABNORMAL MG/DL
LEUKOCYTE ESTERASE UR QL STRIP: ABNORMAL
LIPASE SERPL-CCNC: 35 U/L (ref 13–60)
LYMPHOCYTES # BLD AUTO: 1.8 10E3/UL (ref 0.8–5.3)
LYMPHOCYTES NFR BLD AUTO: 31 %
MCH RBC QN AUTO: 31.7 PG (ref 26.5–33)
MCHC RBC AUTO-ENTMCNC: 33.9 G/DL (ref 31.5–36.5)
MCV RBC AUTO: 93 FL (ref 78–100)
MONOCYTES # BLD AUTO: 0.4 10E3/UL (ref 0–1.3)
MONOCYTES NFR BLD AUTO: 7 %
NEUTROPHILS # BLD AUTO: 3.6 10E3/UL (ref 1.6–8.3)
NEUTROPHILS NFR BLD AUTO: 60 %
NITRATE UR QL: ABNORMAL
NRBC # BLD AUTO: 0 10E3/UL
NRBC BLD AUTO-RTO: 0 /100
PH UR STRIP: ABNORMAL [PH]
PLATELET # BLD AUTO: 232 10E3/UL (ref 150–450)
POTASSIUM SERPL-SCNC: 3.8 MMOL/L (ref 3.4–5.3)
PROT SERPL-MCNC: 7.5 G/DL (ref 6.4–8.3)
RBC # BLD AUTO: 4.26 10E6/UL (ref 3.8–5.2)
RBC URINE: 2 /HPF
SODIUM SERPL-SCNC: 138 MMOL/L (ref 135–145)
SP GR UR STRIP: 1 (ref 1–1.03)
SQUAMOUS EPITHELIAL: 1 /HPF
UROBILINOGEN UR STRIP-MCNC: ABNORMAL MG/DL
WBC # BLD AUTO: 6 10E3/UL (ref 4–11)
WBC CLUMPS #/AREA URNS HPF: PRESENT /HPF
WBC URINE: 61 /HPF

## 2024-03-10 PROCEDURE — 82248 BILIRUBIN DIRECT: CPT | Performed by: EMERGENCY MEDICINE

## 2024-03-10 PROCEDURE — 87086 URINE CULTURE/COLONY COUNT: CPT | Performed by: EMERGENCY MEDICINE

## 2024-03-10 PROCEDURE — 74176 CT ABD & PELVIS W/O CONTRAST: CPT

## 2024-03-10 PROCEDURE — 36415 COLL VENOUS BLD VENIPUNCTURE: CPT | Performed by: STUDENT IN AN ORGANIZED HEALTH CARE EDUCATION/TRAINING PROGRAM

## 2024-03-10 PROCEDURE — 96365 THER/PROPH/DIAG IV INF INIT: CPT

## 2024-03-10 PROCEDURE — 83690 ASSAY OF LIPASE: CPT | Performed by: EMERGENCY MEDICINE

## 2024-03-10 PROCEDURE — 250N000011 HC RX IP 250 OP 636: Performed by: EMERGENCY MEDICINE

## 2024-03-10 PROCEDURE — 76705 ECHO EXAM OF ABDOMEN: CPT

## 2024-03-10 PROCEDURE — 81001 URINALYSIS AUTO W/SCOPE: CPT | Performed by: EMERGENCY MEDICINE

## 2024-03-10 PROCEDURE — 250N000013 HC RX MED GY IP 250 OP 250 PS 637: Performed by: EMERGENCY MEDICINE

## 2024-03-10 PROCEDURE — 80048 BASIC METABOLIC PNL TOTAL CA: CPT | Performed by: EMERGENCY MEDICINE

## 2024-03-10 PROCEDURE — 99285 EMERGENCY DEPT VISIT HI MDM: CPT | Mod: 25

## 2024-03-10 PROCEDURE — 87186 SC STD MICRODIL/AGAR DIL: CPT | Performed by: EMERGENCY MEDICINE

## 2024-03-10 PROCEDURE — 85004 AUTOMATED DIFF WBC COUNT: CPT | Performed by: EMERGENCY MEDICINE

## 2024-03-10 RX ORDER — ACETAMINOPHEN 325 MG/1
975 TABLET ORAL ONCE
Status: COMPLETED | OUTPATIENT
Start: 2024-03-10 | End: 2024-03-10

## 2024-03-10 RX ORDER — CEFTRIAXONE 1 G/1
1 INJECTION, POWDER, FOR SOLUTION INTRAMUSCULAR; INTRAVENOUS ONCE
Status: COMPLETED | OUTPATIENT
Start: 2024-03-10 | End: 2024-03-10

## 2024-03-10 RX ORDER — CEFDINIR 300 MG/1
300 CAPSULE ORAL 2 TIMES DAILY
Qty: 20 CAPSULE | Refills: 0 | Status: SHIPPED | OUTPATIENT
Start: 2024-03-10

## 2024-03-10 RX ADMIN — CEFTRIAXONE SODIUM 1 G: 1 INJECTION, POWDER, FOR SOLUTION INTRAMUSCULAR; INTRAVENOUS at 17:54

## 2024-03-10 RX ADMIN — ACETAMINOPHEN 975 MG: 325 TABLET ORAL at 16:25

## 2024-03-10 ASSESSMENT — COLUMBIA-SUICIDE SEVERITY RATING SCALE - C-SSRS
6. HAVE YOU EVER DONE ANYTHING, STARTED TO DO ANYTHING, OR PREPARED TO DO ANYTHING TO END YOUR LIFE?: NO
2. HAVE YOU ACTUALLY HAD ANY THOUGHTS OF KILLING YOURSELF IN THE PAST MONTH?: NO
1. IN THE PAST MONTH, HAVE YOU WISHED YOU WERE DEAD OR WISHED YOU COULD GO TO SLEEP AND NOT WAKE UP?: NO

## 2024-03-10 ASSESSMENT — ACTIVITIES OF DAILY LIVING (ADL)
ADLS_ACUITY_SCORE: 35

## 2024-03-10 NOTE — ED TRIAGE NOTES
2/13 had UTI, got shot that day for concern of kidneys, then finished 7 day course of cephalexin. Few days later developed UTI symptoms again and was put on another 7 day course, increased to 3x/day, finished that course on Thursday. Last night developed symptoms again along with right flank pain that started before Saturday. No blood in urine.  Took tylenol at 1400, AZO at 1300.

## 2024-03-10 NOTE — ED PROVIDER NOTES
EMERGENCY DEPARTMENT ENCOUNTER      NAME: Nupur De La Rosa  AGE: 48 year old female  YOB: 1975  MRN: [id number]  EVALUATION DATE & TIME: 3/10/2024  3:45 PM    PCP: Nelda Rebollar    ED PROVIDER: Samantha Hudson M.D.      Chief Complaint   Patient presents with    recurrent UTI         FINAL IMPRESSION:  1. Urinary tract infection without hematuria, site unspecified          ED COURSE & MEDICAL DECISION MAKING:    ED Course as of 03/10/24 1827   Sun Mar 10, 2024   1600 Pt with 2 days urinary urgency and frequency with recent UTI and s/p  keflex therapy with E coli sensistive to all but bactrim and ampicillin therapy, last keflex Thursday. Pending UA, RUQ US, LFTs/lipase, chemistry and plan to reassess with VS WNL and CBC WNL, givn tylenol for crampy right flank discomfort   1723 UA with 61 WBC and bacteria, LE and nitrite unreadable due to Azo present in system, with nromal CBC And BMP, LFTs and lipase ok. With right hydronephrosis present, CT pendign to ensure no infected ureteral stone as nidus for infection and rocephin begun   1826 CT without ureteral stone reassuringly, and pt feeling improved. With recurrent UTI, begun on cefdinir therapy. Patient discharged after being provided with extensive anticipatory guidance and given return precautions, importance of PMD follow-up emphasized.        Pertinent Labs & Imaging studies reviewed. (See chart for details)    N95 worn  A face shield was worn also  COVID PPE    Medical Decision Making  Obtained supplemental history:Supplemental history obtained?: No  Reviewed external records: External records reviewed?: Documented in chart  Care impacted by chronic illness:Other: recurrent UTI  Care significantly affected by social determinants of health:N/A  Did you consider but not order tests?: Work up considered but not performed and documented in chart, if applicable  Did you interpret images independently?: Independent interpretation of  ECG and images noted in documentation, when applicable.  Consultation discussion with other provider:Did you involve another provider (consultant, , pharmacy, etc.)?: No  Discharge. I prescribed additional prescription strength medication(s) as charted. I considered admission, but discharged patient after significant clinical improvement.    At the conclusion of the encounter I discussed the results of all of the tests and the disposition. The questions were answered. The patient or family acknowledged understanding and was agreeable with the care plan.     MEDICATIONS GIVEN IN THE EMERGENCY:  Medications   acetaminophen (TYLENOL) tablet 975 mg (975 mg Oral $Given 3/10/24 1625)   cefTRIAXone (ROCEPHIN) 1 g vial to attach to  mL bag for ADULTS or NS 50 mL bag for PEDS (1 g Intravenous $New Bag 3/10/24 4051)       NEW PRESCRIPTIONS STARTED AT TODAY'S ER VISIT  New Prescriptions    CEFDINIR (OMNICEF) 300 MG CAPSULE    Take 1 capsule (300 mg) by mouth 2 times daily          =================================================================    HPI      Nupur De La Rosa is a 48 year old female with PMHx of recurrent UTIs who presents to the ED today via walk in with urinary symptoms and abdominal pain.    Per chart review, most recent UA from 2/29/24 grew E. Coli. Pansensitive except to sulfa antibiotics and ampicillin. UTI most recently treated with Keflex.    Patient says she finished her course of Keflex on 3/7 (3 days ago). Last evening, she developed urinary symptoms of urgency and frequency again. Also reports having cramping right flank pain that she rates at 2/10.   Today, she developed RUQ abdominal pain. She took Azo around 1300 and Tylenol at 1400 today. Patient endorses history of hysterectomy. No history of kidney stones, cholecystectomy, or appendectomy. Urology appointment scheduled for 3/12. She denies fever, nausea, vomiting, vaginal discharge, or any other complaints at this time.       REVIEW  OF SYSTEMS   All other systems reviewed and are negative except as noted above in HPI.    PAST MEDICAL HISTORY:  Past Medical History:   Diagnosis Date    Cervical cancer (H) 1999    radiation therapy and radiation implants       PAST SURGICAL HISTORY:  Past Surgical History:   Procedure Laterality Date    HYSTERECTOMY  1999       CURRENT MEDICATIONS:    cefdinir (OMNICEF) 300 MG capsule  estrogens, conjugated, (PREMARIN) 1.25 MG tablet  methylPREDNISolone (MEDROL DOSEPAK) 4 MG tablet therapy pack        ALLERGIES:  Allergies   Allergen Reactions    Compazine Visual Disturbance    Amoxicillin Rash       FAMILY HISTORY:  Family History   Problem Relation Age of Onset    Thyroid Disease Mother     Breast Cancer Mother 55    Thyroid Disease Maternal Grandmother     Cancer Other         self       SOCIAL HISTORY:   Social History     Socioeconomic History    Marital status:    Occupational History    Occupation:      Employer: [employer]     Comment:  center   Tobacco Use    Smoking status: Never    Smokeless tobacco: Never   Substance and Sexual Activity    Alcohol use: No    Drug use: No    Sexual activity: Yes     Partners: Male   Other Topics Concern    Parent/sibling w/ CABG, MI or angioplasty before 65F 55M? No   Social History Narrative    Caffeine intake/servings daily - 0-1    Calcium intake/servings daily - 2-3    Exercise 5 times weekly - describe walk    Sunscreen used - Yes    Seatbelts used - Yes    Guns stored in the home - No    Self Breast Exam - Yes    Pap test up to date -  Yes    Eye exam up to date -  Yes    Dental exam up to date -  Yes    DEXA scan up to date -  Not Applicable    Flex Sig/Colonoscopy up to date -  11 yrs ago    Mammography up to date -  11 yrs ago    Immunizations reviewed and up to date -NO    Abuse: Current or Past (Physical, Sexual or Emotional) - No    Do you feel safe in your environment - Yes    Do you cope well with stress - Yes    Do you suffer  "from insomnia - No    Last updated by: Aliyah Cuadra MA 6/3/2011                   VITALS:  Patient Vitals for the past 24 hrs:   BP Temp Temp src Pulse Resp SpO2 Height Weight   03/10/24 1800 111/55 -- -- -- -- -- -- --   03/10/24 1730 110/53 -- -- -- -- -- -- --   03/10/24 1726 119/58 -- -- -- -- -- -- --   03/10/24 1515 133/61 97.8  F (36.6  C) Oral 77 20 99 % 1.676 m (5' 6\") 64.2 kg (141 lb 8 oz)       PHYSICAL EXAM    GENERAL: Awake, alert.  In no acute distress.   HEENT: Normocephalic, atraumatic.  Pupils equal, round and reactive.  Conjunctiva normal.  EOMI.  NECK: No stridor or apparent deformity.  PULMONARY: Symmetrical breath sounds without distress.  Lungs clear to auscultation bilaterally without wheezes, rhonchi or rales.  CARDIO: Regular rate and rhythm.  No significant murmur, rub or gallop.  Radial pulses strong and symmetrical.  ABDOMINAL: RUQ tender without rebound or guarding. Abdomen soft, non-distended. No CVAT, no palpable hepatosplenomegaly.  EXTREMITIES: No lower extremity swelling or edema.    NEURO: Alert and oriented to person, place and time.  Cranial nerves grossly intact.  No focal motor deficit.  PSYCH: Normal mood and affect  SKIN: No rashes      LAB:  All pertinent labs reviewed and interpreted.  Results for orders placed or performed during the hospital encounter of 03/10/24   US Abdomen Limited    Impression    IMPRESSION:  1.  Mild right hydronephrosis. CT imaging of the abdomen and pelvis is recommended for further assessment.  2.  Mild extrahepatic biliary ductal dilatation, incompletely evaluated distally. Correlation with LFTs and consideration of MRCP imaging is recommended, if CT imaging is not of additional help.         CT Abdomen Pelvis w/o Contrast    Impression    IMPRESSION:   1.  Mild to moderate right hydronephrosis, associated with mild diffuse wall thickening of the right ureter. A discretely obstructing stone or mass lesion is not identified on this examination. " Findings may simply be attributable to a right upper urinary   tract infection, though CT urography follow-up is recommended to exclude a distal obstructing process, such as malignancy.  2.  Chronic versus acute on chronic cystitis.  3.  Status post hysterectomy and pelvic lymph node dissection, with additional postradiation change involving the colon/rectum within the pelvis and possibly the urinary bladder.  4.  Anemia.       UA with Microscopic reflex to Culture    Specimen: Urine, Midstream   Result Value Ref Range    Color Urine Orange (A) Colorless, Straw, Light Yellow, Yellow    Appearance Urine Clear Clear    Glucose Urine      Bilirubin Urine      Ketones Urine      Specific Gravity Urine 1.005 1.001 - 1.030    Blood Urine      pH Urine      Protein Albumin Urine      Urobilinogen Urine      Nitrite Urine      Leukocyte Esterase Urine      Bacteria Urine Few (A) None Seen /HPF    WBC Clumps Urine Present (A) None Seen /HPF    RBC Urine 2 <=2 /HPF    WBC Urine 61 (H) <=5 /HPF    Squamous Epithelials Urine 1 <=1 /HPF   Basic metabolic panel   Result Value Ref Range    Sodium 138 135 - 145 mmol/L    Potassium 3.8 3.4 - 5.3 mmol/L    Chloride 103 98 - 107 mmol/L    Carbon Dioxide (CO2) 28 22 - 29 mmol/L    Anion Gap 7 7 - 15 mmol/L    Urea Nitrogen 9.5 6.0 - 20.0 mg/dL    Creatinine 0.70 0.51 - 0.95 mg/dL    GFR Estimate >90 >60 mL/min/1.73m2    Calcium 9.1 8.6 - 10.0 mg/dL    Glucose 101 (H) 70 - 99 mg/dL   CBC with platelets and differential   Result Value Ref Range    WBC Count 6.0 4.0 - 11.0 10e3/uL    RBC Count 4.26 3.80 - 5.20 10e6/uL    Hemoglobin 13.5 11.7 - 15.7 g/dL    Hematocrit 39.8 35.0 - 47.0 %    MCV 93 78 - 100 fL    MCH 31.7 26.5 - 33.0 pg    MCHC 33.9 31.5 - 36.5 g/dL    RDW 12.0 10.0 - 15.0 %    Platelet Count 232 150 - 450 10e3/uL    % Neutrophils 60 %    % Lymphocytes 31 %    % Monocytes 7 %    % Eosinophils 2 %    % Basophils 0 %    % Immature Granulocytes 0 %    NRBCs per 100 WBC 0 <1  /100    Absolute Neutrophils 3.6 1.6 - 8.3 10e3/uL    Absolute Lymphocytes 1.8 0.8 - 5.3 10e3/uL    Absolute Monocytes 0.4 0.0 - 1.3 10e3/uL    Absolute Eosinophils 0.1 0.0 - 0.7 10e3/uL    Absolute Basophils 0.0 0.0 - 0.2 10e3/uL    Absolute Immature Granulocytes 0.0 <=0.4 10e3/uL    Absolute NRBCs 0.0 10e3/uL   Extra Blue Top Tube   Result Value Ref Range    Hold Specimen JIC    Extra Red Top Tube   Result Value Ref Range    Hold Specimen JIC    Hepatic function panel   Result Value Ref Range    Protein Total 7.5 6.4 - 8.3 g/dL    Albumin 4.0 3.5 - 5.2 g/dL    Bilirubin Total 0.6 <=1.2 mg/dL    Alkaline Phosphatase 54 40 - 150 U/L    AST 15 0 - 45 U/L    ALT 14 0 - 50 U/L    Bilirubin Direct <0.20 0.00 - 0.30 mg/dL   Result Value Ref Range    Lipase 35 13 - 60 U/L       RADIOLOGY:  Reviewed all pertinent imaging. Please see official radiology report.  CT Abdomen Pelvis w/o Contrast   Final Result   IMPRESSION:    1.  Mild to moderate right hydronephrosis, associated with mild diffuse wall thickening of the right ureter. A discretely obstructing stone or mass lesion is not identified on this examination. Findings may simply be attributable to a right upper urinary    tract infection, though CT urography follow-up is recommended to exclude a distal obstructing process, such as malignancy.   2.  Chronic versus acute on chronic cystitis.   3.  Status post hysterectomy and pelvic lymph node dissection, with additional postradiation change involving the colon/rectum within the pelvis and possibly the urinary bladder.   4.  Anemia.            US Abdomen Limited   Final Result   IMPRESSION:   1.  Mild right hydronephrosis. CT imaging of the abdomen and pelvis is recommended for further assessment.   2.  Mild extrahepatic biliary ductal dilatation, incompletely evaluated distally. Correlation with LFTs and consideration of MRCP imaging is recommended, if CT imaging is not of additional help.                       Madhuri VILLARREAL  Cj, am serving as a scribe to document services personally performed by Dr. Samantha Hudson based on my observation and the provider's statements to me. I, Samantha Hudson MD attest that Madhuri Corona is acting in a scribe capacity, has observed my performance of the services and has documented them in accordance with my direction.       Samantha Hudson MD  03/10/24 1822     09-Dec-2019 14:41

## 2024-03-11 LAB — BACTERIA UR CULT: ABNORMAL

## 2024-03-12 ENCOUNTER — TELEPHONE (OUTPATIENT)
Dept: EMERGENCY MEDICINE | Facility: HOSPITAL | Age: 49
End: 2024-03-12
Payer: COMMERCIAL

## 2024-03-12 NOTE — TELEPHONE ENCOUNTER
[facility]    Reason for call: Lab Result Notification     Lab Result (including Rx patient on, if applicable).  If culture, copy of lab report at bottom.  Lab Result: Final Urine Culture Report on 3/11/24  [facility] Emergency Dept discharge antibiotic prescribed:  Cefdinir (Omnicef) 300 mg capsule, 1 capsule (300 mg) by mouth 2 times daily for 10 days   Date of Rx (if applicable):  03/10/2024     #1. Bacteria, 50,000 - 100,000 CFU/ML Escherichia coli is SUSCEPTIBLE to Antibiotic.    No change in treatment per [facility] ED lab result Urine Culture protocol.    Creatinine Level (mg/dl)   Creatinine   Date Value Ref Range Status   03/10/2024 0.70 0.51 - 0.95 mg/dL Final   03/01/2013 0.58 0.52 - 1.04 mg/dL Final    Creatinine clearance (ml/min), if applicable    Serum creatinine: 0.7 mg/dL 03/10/24 1538  Estimated creatinine clearance: 99.6 mL/min     Patient's current Symptoms:   Left voicemail message requesting a call back to [facility] ED Lab Result RN at [phone]. RN is available every day between 9 a.m. and 5:30 p.m.     Ismael Gómez RN

## 2024-03-13 NOTE — TELEPHONE ENCOUNTER
[facility]    Reason for call: Lab Result Notification     Lab Result (including Rx patient on, if applicable).  If culture, copy of lab report at bottom.  Lab Result:  Final Urine Culture Report on 3/11/24  [facility] Emergency Dept discharge antibiotic prescribed:  Cefdinir (Omnicef) 300 mg capsule, 1 capsule (300 mg) by mouth 2 times daily for 10 days   Date of Rx (if applicable):  03/10/2024  #1. Bacteria, 50,000 - 100,000 CFU/ML Escherichia coli is SUSCEPTIBLE to Antibiotic.    No change in treatment per [facility] ED lab result Urine Culture protocol.       Creatinine Level (mg/dl)   Creatinine   Date Value Ref Range Status   03/10/2024 0.70 0.51 - 0.95 mg/dL Final   03/01/2013 0.58 0.52 - 1.04 mg/dL Final    Creatinine clearance (ml/min), if applicable    Serum creatinine: 0.7 mg/dL 03/10/24 1538  Estimated creatinine clearance: 99.6 mL/min     Patient's current Symptoms:   My symptoms are subsiding.    Confirmed she is taking the antibiotic    RN Recommendations/Instructions per [place] ED lab result protocol:   [facility] ED lab result protocol utilized: urine culture    Patient/care giver notified to contact your PCP clinic or return to the Emergency department if your:  Symptoms do not resolve after completing antibiotic.  Symptoms worsen or other concerning symptoms.    Edin Blanco RN

## 2024-03-30 ENCOUNTER — HEALTH MAINTENANCE LETTER (OUTPATIENT)
Age: 49
End: 2024-03-30

## 2025-01-20 ENCOUNTER — LAB REQUISITION (OUTPATIENT)
Dept: LAB | Facility: CLINIC | Age: 50
End: 2025-01-20

## 2025-01-20 DIAGNOSIS — E78.5 HYPERLIPIDEMIA, UNSPECIFIED: ICD-10-CM

## 2025-01-20 PROCEDURE — 80053 COMPREHEN METABOLIC PANEL: CPT | Performed by: FAMILY MEDICINE

## 2025-01-20 PROCEDURE — 80061 LIPID PANEL: CPT | Performed by: FAMILY MEDICINE

## 2025-01-21 LAB
ALBUMIN SERPL BCG-MCNC: 4.1 G/DL (ref 3.5–5.2)
ALP SERPL-CCNC: 45 U/L (ref 40–150)
ALT SERPL W P-5'-P-CCNC: 16 U/L (ref 0–50)
ANION GAP SERPL CALCULATED.3IONS-SCNC: 10 MMOL/L (ref 7–15)
AST SERPL W P-5'-P-CCNC: 21 U/L (ref 0–45)
BILIRUB SERPL-MCNC: 0.4 MG/DL
BUN SERPL-MCNC: 12.7 MG/DL (ref 6–20)
CALCIUM SERPL-MCNC: 9.1 MG/DL (ref 8.8–10.4)
CHLORIDE SERPL-SCNC: 101 MMOL/L (ref 98–107)
CHOLEST SERPL-MCNC: 246 MG/DL
CREAT SERPL-MCNC: 0.69 MG/DL (ref 0.51–0.95)
EGFRCR SERPLBLD CKD-EPI 2021: >90 ML/MIN/1.73M2
FASTING STATUS PATIENT QL REPORTED: NO
FASTING STATUS PATIENT QL REPORTED: NO
GLUCOSE SERPL-MCNC: 89 MG/DL (ref 70–99)
HCO3 SERPL-SCNC: 25 MMOL/L (ref 22–29)
HDLC SERPL-MCNC: 74 MG/DL
LDLC SERPL CALC-MCNC: 146 MG/DL
NONHDLC SERPL-MCNC: 172 MG/DL
POTASSIUM SERPL-SCNC: 4 MMOL/L (ref 3.4–5.3)
PROT SERPL-MCNC: 7.3 G/DL (ref 6.4–8.3)
SODIUM SERPL-SCNC: 136 MMOL/L (ref 135–145)
TRIGL SERPL-MCNC: 129 MG/DL

## 2025-03-13 ENCOUNTER — ANCILLARY PROCEDURE (OUTPATIENT)
Dept: MAMMOGRAPHY | Facility: HOSPITAL | Age: 50
End: 2025-03-13
Attending: FAMILY MEDICINE
Payer: COMMERCIAL

## 2025-03-13 DIAGNOSIS — Z12.31 VISIT FOR SCREENING MAMMOGRAM: ICD-10-CM

## 2025-03-13 PROCEDURE — 77063 BREAST TOMOSYNTHESIS BI: CPT

## 2025-04-13 ENCOUNTER — HEALTH MAINTENANCE LETTER (OUTPATIENT)
Age: 50
End: 2025-04-13